# Patient Record
(demographics unavailable — no encounter records)

---

## 2024-10-18 NOTE — PHYSICAL EXAM
[Alert] : alert [Normal Voice/Communication] : normal voice/communication [Healthy Appearing] : healthy appearing [No Acute Distress] : no acute distress [Well Developed] : well developed [Well Nourished] : well nourished [Sclera] : the sclera and conjunctiva were normal [Hearing Threshold Finger Rub Not [place]] : hearing was normal [Normal Lips/Gums] : the lips and gums were normal [Oropharynx] : the oropharynx was normal [Normal Appearance] : the appearance of the neck was normal [No Neck Mass] : no neck mass was observed [No Respiratory Distress] : no respiratory distress [No Acc Muscle Use] : no accessory muscle use [Respiration, Rhythm And Depth] : normal respiratory rhythm and effort [Auscultation Breath Sounds / Voice Sounds] : lungs were clear to auscultation bilaterally [Heart Rate And Rhythm] : heart rate was normal and rhythm regular [Normal S1, S2] : normal S1 and S2 [Murmurs] : no murmurs [None] : no edema [Bowel Sounds] : normal bowel sounds [Abdomen Tenderness] : non-tender [No Masses] : no abdominal mass palpated [Abdomen Soft] : soft [] : no hepatosplenomegaly [No CVA Tenderness] : no CVA  tenderness [Abnormal Walk] : normal gait [No Joint Swelling] : no joint swelling seen [Normal Color / Pigmentation] : normal skin color and pigmentation [Oriented To Time, Place, And Person] : oriented to person, place, and time [de-identified] : Deferred at this time.

## 2024-10-18 NOTE — REASON FOR VISIT
[Initial Evaluation] : an initial evaluation [FreeTextEntry1] : For colon cancer screening and symptomatic external hemorrhoid

## 2024-10-18 NOTE — ASSESSMENT
[FreeTextEntry1] : Impression: Symptomatic external hemorrhoid with rectal itching.  Colon cancer screening patient is status post a negative screening colonoscopy in 2017 and has no significant lower GI symptoms or complaints as outlined above or first-degree relatives with either colon cancer or colon polyps.  Recommendations: Hydrocortisone 2.5% cream was prescribed for treatment of her symptomatic external hemorrhoid.  Repeat screening colonoscopy in 2027 or 10 years from her last colonoscopy in 2017 for continued colon cancer screening.  She is to return here on an as-needed basis until then.  High-fiber diet for routine colon health was also recommended and explained to the patient today who appeared to understand all of the above instructions, information, and management plan.

## 2024-10-18 NOTE — REVIEW OF SYSTEMS
[Negative] : Heme/Lymph [As Noted in HPI] : as noted in HPI [Abdominal Pain] : no abdominal pain [Vomiting] : no vomiting [Constipation] : no constipation [Diarrhea] : no diarrhea [Heartburn] : no heartburn [Melena (black stool)] : no melena [Bleeding] : no bleeding [Fecal Incontinence (soiling)] : no fecal incontinence [Bloating (gassiness)] : no bloating [FreeTextEntry7] : Rectal itching.

## 2025-05-20 NOTE — DISCUSSION/SUMMARY
[FreeTextEntry1] : Well woman exam  pap done mammo ordered, breast sono bone density utd recommended taking vit. D 2000 units daily and through diet obtain 1200 mg of calcium along with 2.5 hours of weight bearing exercise per week return in 1 year

## 2025-05-20 NOTE — HISTORY OF PRESENT ILLNESS
[Mammogramdate] : 2024 [PapSmeardate] : 1/2024 [BoneDensityDate] : 1/2023 [ColonoscopyDate] : 4 years ago [PGHxTotal] : 2 [BannerxLiving] : 2 [FreeTextEntry1] : c section